# Patient Record
Sex: FEMALE | Race: WHITE
[De-identification: names, ages, dates, MRNs, and addresses within clinical notes are randomized per-mention and may not be internally consistent; named-entity substitution may affect disease eponyms.]

---

## 2017-03-31 ENCOUNTER — HOSPITAL ENCOUNTER (OUTPATIENT)
Dept: HOSPITAL 45 - C.LABSPEC | Age: 32
Discharge: HOME | End: 2017-03-31
Attending: OBSTETRICS & GYNECOLOGY
Payer: COMMERCIAL

## 2017-03-31 ENCOUNTER — HOSPITAL ENCOUNTER (OUTPATIENT)
Dept: HOSPITAL 45 - C.LAB1850 | Age: 32
Discharge: HOME | End: 2017-03-31
Attending: OBSTETRICS & GYNECOLOGY
Payer: COMMERCIAL

## 2017-03-31 DIAGNOSIS — Z34.03: Primary | ICD-10-CM

## 2017-03-31 LAB
APPEARANCE UR: (no result)
BILIRUB UR-MCNC: (no result) MG/DL
COLOR UR: YELLOW
GTGD: 50 GRAMS
HCT VFR BLD CALC: 35 % (ref 37–47)
MANUAL MICROSCOPIC REQUIRED?: NO
NITRITE UR QL STRIP: (no result)
PH UR STRIP: 7 [PH] (ref 4.5–7.5)
REVIEW REQ?: NO
SP GR UR STRIP: 1.02 (ref 1–1.03)
UROBILINOGEN UR-MCNC: (no result) MG/DL

## 2017-04-10 ENCOUNTER — HOSPITAL ENCOUNTER (OUTPATIENT)
Dept: HOSPITAL 45 - C.LAB1850 | Age: 32
Discharge: HOME | End: 2017-04-10
Attending: OBSTETRICS & GYNECOLOGY
Payer: COMMERCIAL

## 2017-04-10 DIAGNOSIS — O28.1: Primary | ICD-10-CM

## 2017-04-10 DIAGNOSIS — Z3A.00: ICD-10-CM

## 2017-05-18 ENCOUNTER — HOSPITAL ENCOUNTER (OUTPATIENT)
Dept: HOSPITAL 45 - C.LABSPEC | Age: 32
Discharge: HOME | End: 2017-05-18
Attending: OBSTETRICS & GYNECOLOGY
Payer: COMMERCIAL

## 2017-05-18 DIAGNOSIS — Z34.03: Primary | ICD-10-CM

## 2017-06-02 ENCOUNTER — HOSPITAL ENCOUNTER (INPATIENT)
Dept: HOSPITAL 45 - C.OPB | Age: 32
LOS: 2 days | Discharge: HOME | End: 2017-06-04
Attending: OBSTETRICS & GYNECOLOGY | Admitting: OBSTETRICS & GYNECOLOGY
Payer: COMMERCIAL

## 2017-06-02 VITALS — SYSTOLIC BLOOD PRESSURE: 126 MMHG | HEART RATE: 65 BPM | DIASTOLIC BLOOD PRESSURE: 88 MMHG | TEMPERATURE: 98.6 F

## 2017-06-02 VITALS
WEIGHT: 145.28 LBS | BODY MASS INDEX: 25.74 KG/M2 | HEIGHT: 62.99 IN | WEIGHT: 145.28 LBS | BODY MASS INDEX: 25.74 KG/M2 | HEIGHT: 62.99 IN

## 2017-06-02 VITALS — TEMPERATURE: 97.88 F | DIASTOLIC BLOOD PRESSURE: 93 MMHG | SYSTOLIC BLOOD PRESSURE: 136 MMHG | HEART RATE: 68 BPM

## 2017-06-02 DIAGNOSIS — O42.92: Primary | ICD-10-CM

## 2017-06-02 DIAGNOSIS — Z3A.38: ICD-10-CM

## 2017-06-02 LAB
EOSINOPHIL NFR BLD AUTO: 218 K/UL (ref 130–400)
HCT VFR BLD CALC: 38.6 % (ref 37–47)
MCH RBC QN AUTO: 31.7 PG (ref 25–34)
MCHC RBC AUTO-ENTMCNC: 33.9 G/DL (ref 32–36)
MCV RBC AUTO: 93.5 FL (ref 80–100)
PMV BLD AUTO: 10.9 FL (ref 7.4–10.4)
RBC # BLD AUTO: 4.13 M/UL (ref 4.2–5.4)
WBC # BLD AUTO: 12.15 K/UL (ref 4.8–10.8)

## 2017-06-02 PROCEDURE — 0WQN0ZZ REPAIR FEMALE PERINEUM, OPEN APPROACH: ICD-10-PCS | Performed by: OBSTETRICS & GYNECOLOGY

## 2017-06-02 RX ADMIN — ROPIVACAINE HYDROCHLORIDE PRN ML: 2 INJECTION, SOLUTION EPIDURAL; INFILTRATION at 13:30

## 2017-06-02 RX ADMIN — SODIUM CHLORIDE, SODIUM LACTATE, POTASSIUM CHLORIDE, AND CALCIUM CHLORIDE SCH MLS/HR: 600; 310; 30; 20 INJECTION, SOLUTION INTRAVENOUS at 08:04

## 2017-06-02 RX ADMIN — DOCUSATE SODIUM SCH MG: 100 CAPSULE, LIQUID FILLED ORAL at 20:30

## 2017-06-02 RX ADMIN — ROPIVACAINE HYDROCHLORIDE PRN ML: 2 INJECTION, SOLUTION EPIDURAL; INFILTRATION at 07:03

## 2017-06-02 RX ADMIN — SODIUM CHLORIDE, SODIUM LACTATE, POTASSIUM CHLORIDE, AND CALCIUM CHLORIDE SCH MLS/HR: 600; 310; 30; 20 INJECTION, SOLUTION INTRAVENOUS at 02:05

## 2017-06-02 RX ADMIN — SODIUM CHLORIDE, SODIUM LACTATE, POTASSIUM CHLORIDE, AND CALCIUM CHLORIDE SCH MLS/HR: 600; 310; 30; 20 INJECTION, SOLUTION INTRAVENOUS at 11:41

## 2017-06-02 NOTE — ANESTHESIA PROCEDURE NOTE
Anesthesia Epidural Removal Nt


Date & Time


Jun 2, 2017 at 16:26





Vital Signs


Pain Intensity:  0.0





Notes


Mental Status:  alert / awake / arousable, participated in evaluation


Nausea / Vomiting:  adequately controlled


Pain:  adequately controlled


Airway Patency, RR, SpO2:  stable & adequate


BP & HR:  stable & adequate


Hydration State:  stable & adequate


Neuraxial Anesthesia:  was administered, sensory block is resolving


Anesthetic Complications:  no major complications apparent, pt satisfied with 

anesthetic care


Epidural:  removed without complications, with tip intact

## 2017-06-02 NOTE — DELIVERY SUMMARY
DATE OF OPERATION:  2017

 

The patient is a 31-year-old  1, P0 white female, EDC of 2017,

who presented with spontaneous rupture of membranes at approximately 11:00 on

the night of .  She presented to labor and delivery with regular

contractions.  She did require Pitocin augmentation when she was 9.5 cm.  She

then became fully dilated and pushed effectively over an intact perineum for

delivery of a viable male infant.  After the head was delivered, the anterior

arm was also presenting and this was then delivered prior to delivering the

rest of the infant.  The infant was then placed on the mother's abdomen for

further attention and stimulation.  The cord was clamped and cut and then the

placenta was expressed intact with a 3-vessel cord.  A first- degree perineal

laceration was repaired with 3-0 chromic in the usual fashion.  Estimated

blood loss was 250 mL.  Mother and infant were doing well after delivery.  1%

lidocaine was used to anesthetize the perineal laceration site and dilute

Pitocin was used postpartum to control postpartum bleeding.  Mother and

infant were doing well after delivery.

 

 

I attest to the content of the Intraoperative Record and any orders documented therein. Any exception
s are noted below.

## 2017-06-03 VITALS — HEART RATE: 60 BPM | TEMPERATURE: 97.52 F | SYSTOLIC BLOOD PRESSURE: 114 MMHG | DIASTOLIC BLOOD PRESSURE: 83 MMHG

## 2017-06-03 VITALS — SYSTOLIC BLOOD PRESSURE: 120 MMHG | TEMPERATURE: 98.06 F | HEART RATE: 65 BPM | DIASTOLIC BLOOD PRESSURE: 82 MMHG

## 2017-06-03 VITALS — SYSTOLIC BLOOD PRESSURE: 129 MMHG | DIASTOLIC BLOOD PRESSURE: 90 MMHG | TEMPERATURE: 97.7 F | HEART RATE: 50 BPM

## 2017-06-03 VITALS — HEART RATE: 57 BPM | SYSTOLIC BLOOD PRESSURE: 118 MMHG | DIASTOLIC BLOOD PRESSURE: 88 MMHG | TEMPERATURE: 97.52 F

## 2017-06-03 VITALS
HEART RATE: 57 BPM | DIASTOLIC BLOOD PRESSURE: 89 MMHG | TEMPERATURE: 97.52 F | OXYGEN SATURATION: 100 % | SYSTOLIC BLOOD PRESSURE: 121 MMHG

## 2017-06-03 VITALS — SYSTOLIC BLOOD PRESSURE: 139 MMHG | HEART RATE: 55 BPM | DIASTOLIC BLOOD PRESSURE: 84 MMHG | TEMPERATURE: 97.7 F

## 2017-06-03 LAB — HCT VFR BLD CALC: 32.8 % (ref 37–47)

## 2017-06-03 RX ADMIN — DOCUSATE SODIUM SCH MG: 100 CAPSULE, LIQUID FILLED ORAL at 20:01

## 2017-06-03 RX ADMIN — Medication PRN MG: at 21:17

## 2017-06-03 RX ADMIN — Medication PRN MG: at 06:20

## 2017-06-03 RX ADMIN — DOCUSATE SODIUM SCH MG: 100 CAPSULE, LIQUID FILLED ORAL at 07:41

## 2017-06-03 RX ADMIN — Medication PRN MG: at 17:30

## 2017-06-03 RX ADMIN — Medication SCH TAB: at 07:42

## 2017-06-03 NOTE — DISCHARGE INSTRUCTIONS
Discharge Instructions


Date of Service


Eugenio 3, 2017.





Admission


Reason for Admission:  LABOR





Discharge


Discharge Diagnosis / Problem:  s/p delivery





Discharge Goals


Goal(s):  Routine recovery after delivery





Medications


Continue Dispensed Medications:  supercream, dermaplast, tucks, lansinoh





Activity Recommendations


Activity Limitations:  as noted below





ACTIVITY RECOMMENDATIONS:





* Gradual return to full activity over the next 2-3 weeks.


* No lifting - nothing heavier than baby over the next 2-3 weeks.


* Do not engage in vigorous exercise, sexual activity or sports until cleared by


   your physician.


* Do not drive or operate any motorized equipment until cleared by your 

physician.


* You may shower/bathe daily.








MEDICATIONS:





For discomfort or pain, you may use Acetaminophen (Tylenol), Ibuprofen (Advil),


or Naproxen (Aleve) following the package directions. For constipation you may 


use Colace following the package directions.








BREAST CARE:





If you are not breast feeding:





*  Wear a supportive bra 24 hours a day for one to two weeks.


*  Avoid stimulating your breasts and nipples as much as possible during the 

first 


    few weeks after delivery.


*  When taking a shower, have the warm water hit your back, not breasts.


*  When your breasts feel full, apply ice packs.  Usually three to four times a 

day


    helps ease the discomfort.


*  Take a mild pain medication (Tylenol / Motrin) when you are uncomfortable.





If breast feeding:





*  Use breast milk to lubricate nipples.  Lansinoh cream may be used for sore 

nipples. 


    You do not need to remove cream prior to breast feeding.  If using a 

different brand


   of cream, check the label for directions regarding removal of cream prior to 

nursing.


*  Wear a supportive bra.


*  If having problems with breasts or breast feeding, call a lactation 

consultant 


    or your health care provider.








EPISIOTOMY CARE:





After delivery, if you have an episiotomy (stitches), the following steps will 

ease


discomfort and aid healing.





*  For the first 24 hours after delivery, place ice packs next to your 

episiotomy to


   help reduce swelling.


*  After the first 24 hour-period, sitz baths, either portable or in the tub, 

are suggested.


    A shower with a shower arm sprayed over the episiotomy may be comforting.


*  Noa care should be done after each voiding and bowel movement.  Squirt warm 

water


    from a plastic bottle over the perineum (region of the body between the 

anus and 


    urinary opening) and pat dry.


*  Use Dermoplast to ease discomfort.  Shake container.  Spray directly over 

the 


    episiotomy.  Place a Tucks on a clean sanitary pad next to your episiotomy.








SPECIAL CARE INSTRUCTIONS:





When you are discharged from the hospital, it is important for you to follow 

the instructions 


listed below:





*  During the first week at home, you should be able to care for yourself and 

your baby.


    In addition, the usual light household activities are encouraged.





*  Limit your activities to the way you feel.  Do not try to clean the house or 

move 


    furniture. Be sensible.





*  If you actively engage in sports and have done so up until the time of your 

delivery, 


    you may resume these activities as soon as you feel able.  This may take up 

to one 


    month or even longer.  Use good judgment.





*  Continue to take your prenatal vitamins for at least six weeks after the 

birth of your baby.





*  Your diet need not be limited unless you were on a special diet before your 

delivery.  


    Breast-feeding mothers need around 2500 calories per day and at least 64-80 

ounces of 


    fluid per day (8 to 10 glasses).





*  You should eat foods from the four major food groups.  Crash diets or fad 

diets are to be 


    avoided.  Eating lean meats, fresh fruits and vegetables, low-fat dairy 

products, high fiber


    foods and a regular exercise program, will help you get back to your pre-

pregnancy weight


    without putting your health at risk.





*  Constipation is sometimes a problem after delivery.  Take a mild laxative as 

needed.  If 


    breast feeding, Milk of Magnesia is acceptable to use. You may use a 

suppository or Fleets


    enema if no episiotomy.





*  A daily shower or tub bath is suggested.  Be sure to thoroughly and gently 

dry the perineum.





*  A bloody vaginal discharge will usually continue until around four weeks 

post partum.  A 


    small amount of bleeding may continue for as long as six weeks.  Vaginal 

discharge changes


    from the bright red bleeding after delivery to pink then brownish and 

finally yellowish-pink 


    before becoming white and disappearing.





*  Bleeding may increase with activity.  Your first period may come in 4-8 

weeks.  If you are 


    breast feeding, your period may be delayed even longer.





*  [place] (sex) can begin whenever both you and your partner feel 

comfortable and do 


    not have any form of genital infection.  It is recommended that you wait at 

least six weeks


    for internal and external healing to occur.  If you have questions, please 

talk to your health


    care practitioner.  A condom should be used to prevent infection and 

pregnancy.





*  Foreplay, gentle intercourse and lubrication is very important the first 

several times to 


    prevent pain.  A water-based lubricant such as K-Y jelly or Astroglide may 

be used.





*  If you have RH negative blood and your baby is RH positive, you will receive 

RHOGAM by 


    injection prior to discharge.  The nurse will give you a card to keep with 

you that has the


    date and place that you received RHOGAM after delivery.





*  During your prenatal care, you had a Rubella screen done to check for the 

presence of 


    rubella antibodies in your blood.  If your test was negative, you will 

receive a Rubella 


    vaccine prior to discharge.  This vaccine may cause a fever, soreness at 

the injection site


    and flu-like symptoms.  If these symptoms persist, notify your health care 

practitioner.  


    Pregnancy is not advised for one month after a Rubella vaccine.





*  Verbalizes understanding of car seat law as reviewed with patient nursing.





*  Car Seat hand-out given and reviewed with patient by nursing.





*  Shaken baby information reviewed with patient by nursing.


 


Call you doctor if:





*  Heavy bleeding (saturating several pads an hour) or passing clots the size 

of your fist.


*  A fever >101 degrees F (38.3 degrees C) on two occasions four hours apart and

/or chills.


*  Unusual pain in the pelvic or vaginal areas.


* "Baby Blues" lasting longer than two weeks.





If you have any questions or concerns, call your health care practitioner at 


[phone].








FOLLOW UP VISIT:





*  Please call the office at [phone] to schedule a 6 week postpartum 


    examination.  It is important you keep this appointment.  It is important 


    for you to make arrangements for either yearly or twice yearly check-ups 


    thereafter.








.





Current Hospital Diet


Patient's current hospital diet: Regular OB Diet





Discharge Diet


Recommended Diet:  Regular Diet





Pending Studies


Studies pending at discharge:  no





Medical Emergencies








.


Who to Call and When:





Medical Emergencies:  If at any time you feel your situation is an emergency, 

please call 911 immediately.





.





Non-Emergent Contact


Non-Emergency issues call your:  Gynecologist





.


.








"Provider Documentation" section prepared by Debbie Cook.








.





VTE Core Measure


Inpt VTE Proph given/why not?:  Treatment not indicated

## 2017-06-03 NOTE — PROGRESS NOTE
Subjective


Eugenio 3, 2017.


Subjective


conversation w/ patient, physical exam


Ambulation:  ambulating normally


Voiding:  no voiding problems


Passing Gas:  Yes


Diet Tolerance:  Regular Diet


Lochia:  Small


Feeding Type:  Breast Feeding


Comment:


baby on the bed with IV fluids





Review of Systems


Constitutional:  No fever, No chills, No sweats, No weight loss, No weakness, 

No fatigue, No problem reported


Abdomen:  No pain, No nausea, No vomiting, No diarrhea, No constipation, No GI 

bleeding, No problem reported


Female :  No see HPI, No dysuria, No urinary frequency, No hematuria, No 

incontinence, No abnormal vaginal bleeding, No vaginal discharge, No problem 

reported





Objective


Vital Signs











  Date Time  Temp Pulse Resp B/P (MAP) Pulse Ox O2 Delivery O2 Flow Rate FiO2


 


6/3/17 03:40 36.5 50 16 129/90 (103)  Room Air  


 


6/3/17 01:15      Room Air  


 


6/3/17 01:15 36.5 55 18 139/84 (102)  Room Air  


 


6/2/17 21:10 36.6 68 20 136/93 (107)  Room Air  


 


6/2/17 18:00      Room Air  


 


6/2/17 18:00 37.0 65 16 126/88 (101)  Room Air  











Physical Exam


General Appearance:  WELL-APPEARING, NO APPARENT DISTRESS


Abdomen:  non tender, soft


Fundus:  Firm, Non-Tender, Relation to Umbilicus (at U)


Extremities:  no calf tenderness





Laboratory Results





Last 24 Hours








Test


  6/3/17


06:00


 


Hemoglobin 11.2 g/dL 


 


Hematocrit 32.8 % 











Assessment and Plan


Day#:  1


Continue Routine Care:


stable postpartum course


continue current care plan.

## 2017-06-04 VITALS — DIASTOLIC BLOOD PRESSURE: 81 MMHG | HEART RATE: 75 BPM | TEMPERATURE: 97.34 F

## 2017-06-04 VITALS
DIASTOLIC BLOOD PRESSURE: 81 MMHG | HEART RATE: 75 BPM | SYSTOLIC BLOOD PRESSURE: 115 MMHG | OXYGEN SATURATION: 100 % | TEMPERATURE: 97.34 F

## 2017-06-04 RX ADMIN — DOCUSATE SODIUM SCH MG: 100 CAPSULE, LIQUID FILLED ORAL at 08:40

## 2017-06-04 RX ADMIN — Medication PRN MG: at 08:46

## 2017-06-04 RX ADMIN — Medication SCH TAB: at 08:40

## 2017-06-04 NOTE — PROGRESS NOTE
Subjective


Jun 4, 2017.


Subjective


conversation w/ patient, physical exam


Ambulation:  ambulating normally


Voiding:  no voiding problems


Diet Tolerance:  Regular Diet


Lochia:  Small


Feeding Type:  Breast Feeding


Pain:  no pain issues





Objective


Vital Signs











  Date Time  Temp Pulse Resp B/P (MAP) Pulse Ox O2 Delivery O2 Flow Rate FiO2


 


6/3/17 23:45     100 Room Air  


 


6/3/17 23:45 36.4 57 18 121/89 (100) 100 Room Air  


 


6/3/17 15:30 36.7 65 20 120/82 (95)  Room Air  


 


6/3/17 15:30      Room Air  


 


6/3/17 11:30 36.4 57 16 118/88 (98)  Room Air  


 


6/3/17 07:30 36.4 60 16 114/83 (93)  Room Air  


 


6/3/17 07:30      Room Air  











Physical Exam


General Appearance:  WELL-APPEARING, WD/WN, NO APPARENT DISTRESS


Respiratory/Chest:  lungs clear


Cardiovascular:  regular rate, rhythm


Abdomen:  non tender, soft


Fundus:  Firm, Relation to Umbilicus (2 down)


Extremities:  non-tender





Assessment and Plan


Day#:  2


Continue Routine Care:


stable, d/c home, instructions reviewed. f/u 6 wks pp check.

## 2018-01-25 ENCOUNTER — HOSPITAL ENCOUNTER (OUTPATIENT)
Dept: HOSPITAL 45 - C.MAMM | Age: 33
Discharge: HOME | End: 2018-01-25
Attending: NURSE PRACTITIONER
Payer: COMMERCIAL

## 2018-01-25 DIAGNOSIS — N63.20: Primary | ICD-10-CM

## 2018-01-25 DIAGNOSIS — R92.1: ICD-10-CM

## 2018-01-25 NOTE — MAMMOGRAPHY REPORT
UNILATERAL LEFT DIGITAL DIAGNOSTIC MAMMOGRAM TOMOSYNTHESIS WITH CAD AND TARGETED LEFT ULTRASOUND: 1/2
5/2018

CLINICAL HISTORY: The patient has been breast-feeding since June.  She reports a palpable left breast
 lump which she first felt around Thanksgiving.  The lump has not noticeably changed since she first 
felt it.  She also reports some mild intermittent pain in her left inferior breast.  





TECHNIQUE:  Breast tomosynthesis in addition to standard 2D mammography was performed. Current study 
was also evaluated with a Computer Aided Detection (CAD) system.  Left CC and MLO 2-D and tomosynthes
is images were obtained.



COMPARISON: No prior exams were available for comparison.   



BREAST COMPOSITION:  The tissue of the left breast is extremely dense, which lowers the sensitivity o
f mammography.  



FINDINGS: First, targeted ultrasound was performed of the area of the palpable lump pointed out by th
e patient, in the left 1:00 periareolar breast.  At the site of the palpable lump there is an oval ma
ss with partially circumscribed and partially obscured margins, measuring 1.5 x 0.7 x 1.2 cm.  The ma
ss is of mixed echogenicity including hyperechoic and hypoechoic areas, with some of the hyperechoic 
areas demonstrating posterior acoustic shadowing.  A few punctate echogenic foci are also seen within
 the mass which may represent calcifications.  Given the heterogeneous appearance on ultrasound, mamm
ograms were obtained.  Additionally, ultrasound was performed of the area of intermittent plain point
ed out by the patient involving the left inferior breast, predominantly in the lower outer quadrant. 
 No suspicious masses or other suspicious sonographic abnormalities are evident in this region.



Mammograms were obtained, which shows diffuse ropy density of the left breast, consistent with lactat
ional changes.  Note that the lactational changes greatly reduce the sensitivity of the exam.  A tria
ngle marker marks the site of the palpable lump in the left upper outer quadrant.  At the site of the
 palpable lump there is an oval mixed density mass which measures 1.1 x 1.0 cm.  Punctate and amorpho
us calcifications are seen within the mass.  This corresponds with the sonographic mass and is indete
rminant.  Recommend ultrasound-guided core needle biopsy for further evaluation.  The differential is
 broad but could include a galactocele or lactating adenoma.







IMPRESSION:  ACR BI-RADS CATEGORY 4: SUSPICIOUS, TARGETED ULTRASOUND ACR BI-RADS CATEGORY 4: SUSPICIO
US 

1. Mixed echogenicity 1.5 cm mass with associated calcifications in the left 1:00 periareolar breast 
at the site of the palpable lump pointed out by the patient.  Given that the patient is lactating, th
e differential is broad and could include a galactocele or lactating adenoma; however, the mass is in
determinate on imaging and ultrasound-guided core needle biopsy is recommended for further evaluation
.

2.  No suspicious mammographic or sonographic abnormality at the site of intermittent left inferior b
reast pain pointed out by the patient.  Recommend clinical follow-up.



A phone call was made to the physician's office to confirm faxed results were received.  The patient 
has been verbally notified of the results.  She tentatively scheduled the biopsy before leaving the [facility].





Approximately 10% of breast cancers are not detected with mammography. A negative mammographic report
 should not delay biopsy if a clinically suggestive mass is present.



Amita Schroeder M.D.          

ah/:1/25/2018 12:02:24  



Imaging Technologist: Rosie Villalpando, [facility]

letter sent: Abnormal 4/5  

BI-RADS Code: ACR BI-RADS Category 4: Suspicious  Ultrasound BI-RADS: ACR BI-RADS Category 4: Suspici
ous

## 2018-02-01 ENCOUNTER — HOSPITAL ENCOUNTER (OUTPATIENT)
Dept: HOSPITAL 45 - C.MAMM | Age: 33
Discharge: HOME | End: 2018-02-01
Attending: NURSE PRACTITIONER
Payer: COMMERCIAL

## 2018-02-01 DIAGNOSIS — N63.20: Primary | ICD-10-CM

## 2018-02-01 DIAGNOSIS — R92.0: ICD-10-CM

## 2018-02-01 NOTE — MAMMOGRAPHY REPORT
ULTRASOUND GUIDED BIOPSY LEFT BREAST: 2/1/2018

CLINICAL HISTORY: Left 1:00 breast mass.  



PATIENT CONSENT: The procedure and risks were discussed with the patient and informed written consent
 was obtained.  The risk of a milk fistula given that the patient is breast-feeding was also discusse
d.  A timeout was performed immediately prior to the procedure.    



PROCEDURE DESCRIPTION: With ultrasound guidance, aseptic technique, and lidocaine as the local anesth
etic (1% lidocaine to anesthetize the skin and 1% lidocaine with epinephrine to anesthetize the deepe
r tissues), an attempt was made to aspirate the mass, however, it would not aspirate therefore biopsy
 was performed. The mass of concern in the left 1:00 breast was sampled 3 times with a 14-gauge Achie
ve biopsy needle.  Immediately thereafter, with ultrasound guidance, aseptic technique, and lidocaine
 as the local anesthetic, a metallic localizer clip was placed centrally in the mass.  Direct pressur
e was applied to the site immediately post procedure and hemostasis was achieved. The patient tolerat
ed the procedure without complication.  She was given wound care instructions. The specimens were sen
t to pathology for analysis.  





COMPARISON: Comparison is made to exams dated:  1/25/2018 mammogram and 1/25/2018 ultrasound - [facility].   







IMPRESSION: ULTRASOUND GUIDED BIOPSY

Ultrasound guided core needle biopsy of the left 1:00 breast mass, with clip placement.  The patient 
will receive pathology results from her referring provider.



Amita Schroeder M.D.  

ah/:2/1/2018 09:55:42  



Attending Technologist: Amita Schroeder MD, [facility]

Imaging Technologist: Juan Yung RT(R)(M), [facility]

## 2018-02-01 NOTE — DISCHARGE INSTRUCTIONS
Discharge Instructions


Procedure


Procedure Date:


Feb 1, 2018.


Reason for visit:


Left Mass.





Discharge


Discharge Date:


Feb 1, 2018.


Discharge Diagnosis:


status post breast biopsy





Instructions


Activity Recommendations:  Additional Limitations (see below)


Return to School/Work:  no limitations


Recommended Home Diet:  No Limitations


Provider Instructions:





ACTIVITY RECOMMENDATIONS:





*  No lifting, pushing, pulling or exercising the affected side for three days.








RETURN TO SCHOOL/WORK:





*  You may return to work/school after the procedure, but do not perform any 

strenuous


   activities for 24 to 48 hours.








MEDICATIONS:





*  Tylenol (two 325 mg) every four to six hours if needed for mild pain (if not 

allergic to Tylenol).








DIET:





*  Resume previous diet.








SPECIAL CARE INSTRUCTIONS:





*  Keep biopsy site dry for 24 hours.  May shower after 24 hours, but do not 

soak (bathe)


   incision.





*  May remove Tegaderm (plastic patch) tomorrow AFTER showering.





*  Leave the steri-strips on for one week.  Allow the steri-strips to fall off 

by themselves.  


   If not off after one week, you may remove them.  You may place a Bandaid


   crosswise over the strips, if desired.





*  Apply ice 10 minutes on and 10 minutes off as needed.





*  Wear a bra at bedtime to sleep more comfortably for 2-3 days.





*  Your referring physician should have the results after approximately 5 to 7 

business days.





*  Call for unusual bleeding, fever, drainage, etc or if you have any questions 

call


    [phone] during normal business hours or after hours call Dr Schroeder, [phone].








FOLLOW UP VISIT:





Follow-up with Referring Physician as scheduled.





Allergies


Coded Allergies:  


     No Known Allergies (Unverified , 6/2/17)





Omer Trejo Recommendations:


 


Call your doctor if:





*  Temperature above 101 degrees


*  Pain not relieved by pain medicine ordered


*  There is increased drainage or redness from any incision


*  You have any unanswered questions or concerns.





Your Doctors Instructions noted above were prepared by provider Amita Schroeder.


Patient Signature Section:





 Patient Instructions Signature Page














Jo-Ann White 











Patient (or Guardian) Signature/Date:____________________________________ I 

have read and understand the instructions given to me by my caregivers.








Caregiver/RN/Doctor Signature/Date:____________________________________











The above-named patient and/or guardian has received patient instructions on 

this date.





























+  Original Patient Signature Page (only) stays with chart.  Please make copy 

for patient.